# Patient Record
Sex: FEMALE | Race: WHITE | Employment: FULL TIME | URBAN - METROPOLITAN AREA
[De-identification: names, ages, dates, MRNs, and addresses within clinical notes are randomized per-mention and may not be internally consistent; named-entity substitution may affect disease eponyms.]

---

## 2022-02-25 ENCOUNTER — HOSPITAL ENCOUNTER (EMERGENCY)
Age: 32
Discharge: HOME | End: 2022-02-25
Attending: EMERGENCY MEDICINE
Payer: COMMERCIAL

## 2022-02-25 ENCOUNTER — APPOINTMENT (OUTPATIENT)
Dept: GENERAL RADIOLOGY | Age: 32
End: 2022-02-25
Payer: COMMERCIAL

## 2022-02-25 VITALS
HEART RATE: 70 BPM | DIASTOLIC BLOOD PRESSURE: 80 MMHG | SYSTOLIC BLOOD PRESSURE: 128 MMHG | OXYGEN SATURATION: 100 % | WEIGHT: 200.4 LBS | BODY MASS INDEX: 31.45 KG/M2 | HEIGHT: 67 IN | TEMPERATURE: 98.5 F | RESPIRATION RATE: 16 BRPM

## 2022-02-25 DIAGNOSIS — M25.872 SESAMOIDITIS OF LEFT FOOT: ICD-10-CM

## 2022-02-25 DIAGNOSIS — M79.672 LEFT FOOT PAIN: Primary | ICD-10-CM

## 2022-02-25 PROCEDURE — 6370000000 HC RX 637 (ALT 250 FOR IP): Performed by: EMERGENCY MEDICINE

## 2022-02-25 PROCEDURE — 99284 EMERGENCY DEPT VISIT MOD MDM: CPT

## 2022-02-25 PROCEDURE — 73630 X-RAY EXAM OF FOOT: CPT

## 2022-02-25 RX ORDER — IBUPROFEN 400 MG/1
400 TABLET ORAL ONCE
Status: COMPLETED | OUTPATIENT
Start: 2022-02-25 | End: 2022-02-25

## 2022-02-25 RX ORDER — ACETAMINOPHEN 325 MG/1
650 TABLET ORAL ONCE
Status: COMPLETED | OUTPATIENT
Start: 2022-02-25 | End: 2022-02-25

## 2022-02-25 RX ORDER — IBUPROFEN 400 MG/1
400 TABLET ORAL 4 TIMES DAILY PRN
Qty: 60 TABLET | Refills: 1 | Status: SHIPPED | OUTPATIENT
Start: 2022-02-25

## 2022-02-25 RX ORDER — ACETAMINOPHEN 325 MG/1
325 TABLET ORAL EVERY 6 HOURS PRN
Qty: 60 TABLET | Refills: 0 | Status: SHIPPED | OUTPATIENT
Start: 2022-02-25

## 2022-02-25 RX ADMIN — ACETAMINOPHEN 650 MG: 325 TABLET ORAL at 10:47

## 2022-02-25 RX ADMIN — IBUPROFEN 400 MG: 400 TABLET, FILM COATED ORAL at 10:47

## 2022-02-25 ASSESSMENT — PAIN DESCRIPTION - LOCATION
LOCATION: FOOT

## 2022-02-25 ASSESSMENT — PAIN SCALES - GENERAL
PAINLEVEL_OUTOF10: 5
PAINLEVEL_OUTOF10: 8
PAINLEVEL_OUTOF10: 6
PAINLEVEL_OUTOF10: 8

## 2022-02-25 ASSESSMENT — PAIN - FUNCTIONAL ASSESSMENT: PAIN_FUNCTIONAL_ASSESSMENT: 0-10

## 2022-02-25 ASSESSMENT — PAIN DESCRIPTION - ORIENTATION
ORIENTATION: LEFT
ORIENTATION: LEFT

## 2022-02-25 ASSESSMENT — PAIN DESCRIPTION - DESCRIPTORS: DESCRIPTORS: BURNING

## 2022-02-25 NOTE — Clinical Note
Marylin Barakat was seen and treated in our emergency department on 2/25/2022. She may return to work on 02/26/2022. If you have any questions or concerns, please don't hesitate to call.       Minda Hightower MD

## 2022-02-25 NOTE — ED PROVIDER NOTES
[address]      Pt Name: Jose De Jesus Sarah  MRN: [id number]  [place] 1990  Date of evaluation: 2/25/2022  Provider: Radha Pretty MD    CHIEF COMPLAINT     On Monday I woke up and I had pain in my foot. It hurts when I put weight on it and is gotten worse over the last 4 days. I took some ibuprofen which helped a little. HISTORY OF PRESENT ILLNESS  (Location/Symptom, Timing/Onset,Context/Setting, Quality, Duration, Modifying Factors, Severity). Note limiting factors. Chief Complaint   Patient presents with    Foot Pain     states she woke up 4 days ago with left great toe pain. No known injury      Jose De Jesus Sarah is a 36 y.o. female who presents to the emergency department secondary to concern for left foot pain located at the ball of her foot underneath her big toe. Denies any trauma or falls. Denies wearing new shoes. States she has not done any new activity or hobbies. 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 Denies prior injury to this foot, has not previously needed surgery on this foot. No Past medical history noted below, she reports no significant history. Currently smokes. Aside from what is stated above denies any other symptoms or modifying factors. Nursing Notes reviewed. REVIEW OF SYSTEMS  (2-9 systems for level 4, 10 or more for level 5)   Review of Systems  Pertinent positive and negative findings as documented in the HPI; otherwise all other systems were reviewed and were negative. PAST MEDICAL HISTORY   No past medical history on file. SURGICALHISTORY     No past surgical history on file. CURRENT MEDICATIONS       Previous Medications    No medications on file      ALLERGIES     Patient has no known allergies. FAMILY HISTORY     No family history on file.   SOCIAL HISTORY       Social History     Socioeconomic History    Marital status: Single     Spouse name: Not on file    Number of children: Not on file    Years of education: Not on file    Highest education level: Not on file   Occupational History    Not on file   Tobacco Use    Smoking status: Current Every Day Smoker     Packs/day: 0.50     Types: Cigarettes    Smokeless tobacco: Never Used   Substance and Sexual Activity    Alcohol use: No    Drug use: No    Sexual activity: Not Currently   Other Topics Concern    Not on file   Social History Narrative    Not on file     Social Determinants of Health     Financial Resource Strain:     Difficulty of Paying Living Expenses: Not on file   Food Insecurity:     Worried About Running Out of Food in the Last Year: Not on file    [place] of Food in the Last Year: Not on file   Transportation Needs:     Lack of Transportation (Medical): Not on file    Lack of Transportation (Non-Medical):  Not on file   Physical Activity:     Days of Exercise per Week: Not on file    Minutes of Exercise per Session: Not on file   Stress:     Feeling of Stress : Not on file   Social Connections:     Frequency of Communication with Friends and Family: Not on file    Frequency of Social Gatherings with Friends and Family: Not on file    Attends [employer]ism Services: Not on file    Active Member of [address] [place] [address] [employer] or Organizations: Not on file    Attends Club or Organization Meetings: Not on file    Marital Status: Not on file   Intimate Partner Violence:     Fear of Current or Ex-Partner: Not on file    Emotionally Abused: Not on file    Physically Abused: Not on file    Sexually Abused: Not on file   Housing Stability:     Unable to Pay for Housing in the Last Year: Not on file    Number of [place] in the Last Year: Not on file    Unstable Housing in the Last Year: Not on file     SCREENINGS    German Coma Scale  Eye Opening: Spontaneous  Best Verbal Response: Oriented  Best Motor Response: Obeys commands  German Coma Scale Score: 15    PHYSICAL EXAM  (up to 7 for level 4, 8 or more for level 5)   INITIAL VITALS: BP: 128/80, Temp: 98.5 °F (36.9 °C), Pulse: 70, Resp: 16, SpO2: 100 %   Physical Exam  Vitals reviewed. Constitutional:       Appearance: She is not toxic-appearing or diaphoretic. HENT:      Head: Normocephalic and atraumatic. Right Ear: External ear normal.      Left Ear: External ear normal.   Eyes:      General: No scleral icterus. Conjunctiva/sclera: Conjunctivae normal.   Neck:      Trachea: No tracheal deviation. Cardiovascular:      Rate and Rhythm: Normal rate. Pulses: Normal pulses. Comments: DP pulses palpable bilaterally  Pulmonary:      Effort: Pulmonary effort is normal. No respiratory distress. Musculoskeletal:      Cervical back: Normal range of motion. Right foot: Normal.      Left foot: Normal range of motion and normal capillary refill. Tenderness (point ttp to ball of foot on left, no overlying skin changes, no erythema/warmth) present. No swelling, laceration or crepitus. Normal pulse. Feet:    Skin:     General: Skin is dry. Capillary Refill: Capillary refill takes less than 2 seconds. Neurological:      General: No focal deficit present. Mental Status: She is alert and oriented to person, place, and time.    Psychiatric:         Behavior: Behavior normal.       DIAGNOSTIC RESULTS     RADIOLOGY:   Interpretation per Radiologist below, if available at the time of this note:  XR FOOT LEFT (MIN 3 VIEWS)   Final Result   No acute osseous abnormality           LABS:  Labs Reviewed - No data to display    EMERGENCY DEPARTMENT COURSE and DIFFERENTIAL DIAGNOSIS/MDM:   Patient was given the following medications:  Orders Placed This Encounter   Medications    ibuprofen (ADVIL;MOTRIN) tablet 400 mg    acetaminophen (TYLENOL) tablet 650 mg    ibuprofen (ADVIL;MOTRIN) 400 MG tablet     Sig: Take 1 tablet by mouth 4 times daily as needed for Pain or Fever     Dispense:  60 tablet     Refill:  1    acetaminophen (TYLENOL) 325 MG tablet     Sig: Take 1 tablet by mouth every 6 hours as needed for Pain     Dispense:  60 tablet     Refill:  0     CONSULTS:  None    INITIAL VITALS: BP: 128/80, Temp: 98.5 °F (36.9 °C), Pulse: 70, Resp: 16, SpO2: 100 %     Beulah Sequeira is a 36 y.o. female who presents to the emergency department secondary to concern for symptoms as noted in HPI. On arrival she is awake, alert, oriented with vitals that are HDS. Physical exam is notable for tenderness to palpation of left ball of foot in the area where the sesamoid is. No overlying skin changes, no erythema, no warmth. Ordered xray imaging, tylenol and motrin. Foot XR without any acute osseous abnormalities. On reassessment discussed results of x-ray imaging. Discussed likelihood of sesamoiditis given the location of her pain. Discussed use of NSAIDs and Tylenol for pain along with rest, ice, decreased activity. We talked about importance of follow-up and return precautions. She expressed understanding of all instructions, was in agreement with plan, and was discharged home in stable condition. FINAL IMPRESSION      1. Left foot pain    2.  Sesamoiditis of left foot        DISPOSITION/PLAN   DISPOSITION        PATIENT REFERRED TO:  [facility]    Schedule an appointment as soon as possible for a visit   For follow up appointment, As needed    [address]  [phone]  Schedule an appointment as soon as possible for a visit   For follow up appointment, As needed      DISCHARGE MEDICATIONS:  New Prescriptions    ACETAMINOPHEN (TYLENOL) 325 MG TABLET    Take 1 tablet by mouth every 6 hours as needed for Pain    IBUPROFEN (ADVIL;MOTRIN) 400 MG TABLET    Take 1 tablet by mouth 4 times daily as needed for Pain or Fever            (Please note that portions of this note were completed with a voice recognition program. Efforts were made to edit the dictations but occasionally words are mis-transcribed.)    Daniel Estrada MD (electronically signed)  Attending Emergency Physician        Daniel Estrada MD  02/25/22 5975

## 2022-02-25 NOTE — ED NOTES
AVS reviewed with patient. Verbalized understanding. AVS was printed and given to patient. Prescriptions sent electronically to patients pharmacy of choice.      Siva Cohen RN  02/25/22 1510

## 2022-07-27 ENCOUNTER — APPOINTMENT (OUTPATIENT)
Dept: ULTRASOUND IMAGING | Age: 32
End: 2022-07-27
Payer: COMMERCIAL

## 2022-07-27 ENCOUNTER — HOSPITAL ENCOUNTER (EMERGENCY)
Age: 32
Discharge: HOME | End: 2022-07-27
Attending: EMERGENCY MEDICINE
Payer: COMMERCIAL

## 2022-07-27 VITALS
HEART RATE: 92 BPM | RESPIRATION RATE: 16 BRPM | HEIGHT: 67 IN | SYSTOLIC BLOOD PRESSURE: 152 MMHG | DIASTOLIC BLOOD PRESSURE: 96 MMHG | BODY MASS INDEX: 31.97 KG/M2 | TEMPERATURE: 98.5 F | WEIGHT: 203.71 LBS | OXYGEN SATURATION: 100 %

## 2022-07-27 DIAGNOSIS — O26.891 ABDOMINAL PAIN DURING PREGNANCY IN FIRST TRIMESTER: Primary | ICD-10-CM

## 2022-07-27 DIAGNOSIS — R10.9 ABDOMINAL PAIN DURING PREGNANCY IN FIRST TRIMESTER: Primary | ICD-10-CM

## 2022-07-27 DIAGNOSIS — O36.80X0 PREGNANCY OF UNKNOWN ANATOMIC LOCATION: ICD-10-CM

## 2022-07-27 LAB
A/G RATIO: 1.9 (ref 1.1–2.2)
ALBUMIN SERPL-MCNC: 4.6 G/DL (ref 3.4–5)
ALP BLD-CCNC: 73 U/L (ref 40–129)
ALT SERPL-CCNC: 9 U/L (ref 10–40)
ANION GAP SERPL CALCULATED.3IONS-SCNC: 12 MMOL/L (ref 3–16)
AST SERPL-CCNC: 12 U/L (ref 15–37)
BACTERIA WET PREP: NORMAL
BACTERIA: ABNORMAL /HPF
BASOPHILS ABSOLUTE: 0 K/UL (ref 0–0.2)
BASOPHILS RELATIVE PERCENT: 0.2 %
BILIRUB SERPL-MCNC: 0.3 MG/DL (ref 0–1)
BILIRUBIN URINE: NEGATIVE
BLOOD, URINE: ABNORMAL
BUN BLDV-MCNC: 11 MG/DL (ref 7–20)
CALCIUM SERPL-MCNC: 9.1 MG/DL (ref 8.3–10.6)
CHLORIDE BLD-SCNC: 103 MMOL/L (ref 99–110)
CLARITY: CLEAR
CLUE CELLS: NORMAL
CO2: 24 MMOL/L (ref 21–32)
COLOR: YELLOW
CREAT SERPL-MCNC: 0.7 MG/DL (ref 0.6–1.1)
EOSINOPHILS ABSOLUTE: 0.1 K/UL (ref 0–0.6)
EOSINOPHILS RELATIVE PERCENT: 1.2 %
EPITHELIAL CELLS WET PREP: NORMAL
EPITHELIAL CELLS, UA: ABNORMAL /HPF (ref 0–5)
GFR AFRICAN AMERICAN: >60
GFR NON-AFRICAN AMERICAN: >60
GLUCOSE BLD-MCNC: 93 MG/DL (ref 70–99)
GLUCOSE URINE: NEGATIVE MG/DL
GONADOTROPIN, CHORIONIC (HCG) QUANT: 900.7 MIU/ML
HCG(URINE) PREGNANCY TEST: POSITIVE
HCT VFR BLD CALC: 34.5 % (ref 36–48)
HEMOGLOBIN: 11.7 G/DL (ref 12–16)
KETONES, URINE: NEGATIVE MG/DL
LEUKOCYTE ESTERASE, URINE: ABNORMAL
LYMPHOCYTES ABSOLUTE: 1.5 K/UL (ref 1–5.1)
LYMPHOCYTES RELATIVE PERCENT: 23.3 %
MCH RBC QN AUTO: 29.4 PG (ref 26–34)
MCHC RBC AUTO-ENTMCNC: 33.8 G/DL (ref 31–36)
MCV RBC AUTO: 86.9 FL (ref 80–100)
MICROSCOPIC EXAMINATION: YES
MONOCYTES ABSOLUTE: 0.5 K/UL (ref 0–1.3)
MONOCYTES RELATIVE PERCENT: 7.5 %
NEUTROPHILS ABSOLUTE: 4.4 K/UL (ref 1.7–7.7)
NEUTROPHILS RELATIVE PERCENT: 67.8 %
NITRITE, URINE: NEGATIVE
PDW BLD-RTO: 16 % (ref 12.4–15.4)
PH UA: 6.5 (ref 5–8)
PLATELET # BLD: 277 K/UL (ref 135–450)
PMV BLD AUTO: 8.2 FL (ref 5–10.5)
POTASSIUM SERPL-SCNC: 3.6 MMOL/L (ref 3.5–5.1)
PROTEIN UA: NEGATIVE MG/DL
RBC # BLD: 3.97 M/UL (ref 4–5.2)
RBC UA: ABNORMAL /HPF (ref 0–4)
RBC WET PREP: NORMAL
SODIUM BLD-SCNC: 139 MMOL/L (ref 136–145)
SOURCE WET PREP: NORMAL
SPECIFIC GRAVITY UA: 1.02 (ref 1–1.03)
TOTAL PROTEIN: 7 G/DL (ref 6.4–8.2)
TRICHOMONAS PREP: NORMAL
URINE REFLEX TO CULTURE: ABNORMAL
URINE TYPE: ABNORMAL
UROBILINOGEN, URINE: 1 E.U./DL
WBC # BLD: 6.5 K/UL (ref 4–11)
WBC UA: ABNORMAL /HPF (ref 0–5)
WBC WET PREP: NORMAL
YEAST WET PREP: NORMAL

## 2022-07-27 PROCEDURE — 76801 OB US < 14 WKS SINGLE FETUS: CPT

## 2022-07-27 PROCEDURE — 84703 CHORIONIC GONADOTROPIN ASSAY: CPT

## 2022-07-27 PROCEDURE — 80053 COMPREHEN METABOLIC PANEL: CPT

## 2022-07-27 PROCEDURE — 87491 CHLMYD TRACH DNA AMP PROBE: CPT

## 2022-07-27 PROCEDURE — 85025 COMPLETE CBC W/AUTO DIFF WBC: CPT

## 2022-07-27 PROCEDURE — 36415 COLL VENOUS BLD VENIPUNCTURE: CPT

## 2022-07-27 PROCEDURE — 87591 N.GONORRHOEAE DNA AMP PROB: CPT

## 2022-07-27 PROCEDURE — 99283 EMERGENCY DEPT VISIT LOW MDM: CPT

## 2022-07-27 PROCEDURE — 81001 URINALYSIS AUTO W/SCOPE: CPT

## 2022-07-27 PROCEDURE — 87210 SMEAR WET MOUNT SALINE/INK: CPT

## 2022-07-27 PROCEDURE — 84702 CHORIONIC GONADOTROPIN TEST: CPT

## 2022-07-27 ASSESSMENT — ENCOUNTER SYMPTOMS
COLOR CHANGE: 0
NAUSEA: 0
FACIAL SWELLING: 0
VOMITING: 0
SHORTNESS OF BREATH: 0
STRIDOR: 0
WHEEZING: 0
TROUBLE SWALLOWING: 0
ABDOMINAL PAIN: 1
BACK PAIN: 1
VOICE CHANGE: 0
SORE THROAT: 0
RESPIRATORY NEGATIVE: 1

## 2022-07-27 ASSESSMENT — PAIN - FUNCTIONAL ASSESSMENT: PAIN_FUNCTIONAL_ASSESSMENT: NONE - DENIES PAIN

## 2022-07-27 NOTE — ED PROVIDER NOTES
[address] [facility]  eMERGENCY dEPARTMENT eNCOUnter      Pt Name: Franklin Butler  MRN: [id number]  [place] 1990  Date of evaluation: 2022  Provider: João Blas MD    CHIEF COMPLAINT       Chief Complaint   Patient presents with    Pregnancy Test     Pt states mixed results on pregnancy tests, would like confirmation. Lower back and abd pain x5days. HISTORY OF PRESENT ILLNESS   (Location/Symptom, Timing/Onset, Context/Setting, Quality, Duration, Modifying Factors, Severity)  Note limiting factors. Franklin Butler is a 29 y.o. female  4 weeks 6 days pregnant by LMP who presents for 5 days of cramping lower abdominal and back pain. Patient reports she has had mixed results with her pregnancy test and would also like confirmation that her pregnancy test is positive. Patient [place] any urinary symptoms. Denies any fever. Denies any chest pain shortness of breath or hemoptysis. Reports her symptoms are moderate intermittent and wax and wanes. Denies any known aggravating or alleviating factors. HPI    Nursing Notes were reviewed. REVIEW OFSYSTEMS    (2-9 systems for level 4, 10 or more for level 5)     Review of Systems   Constitutional:  Negative for appetite change, fever and unexpected weight change. HENT:  Negative for facial swelling, trouble swallowing and voice change. Eyes:  Negative for visual disturbance. Respiratory:  Negative for shortness of breath, wheezing and stridor. Cardiovascular:  Negative for chest pain and palpitations. Gastrointestinal:  Positive for abdominal pain. Negative for nausea and vomiting. Genitourinary:  Negative for dysuria and vaginal bleeding. Musculoskeletal:  Positive for back pain. Negative for neck pain and neck stiffness. Skin:  Negative for color change and wound. Neurological:  Negative for seizures and syncope. Psychiatric/Behavioral:  Negative for self-injury and suicidal ideas.       Except as noted above the remainder of the review of systems was reviewed and negative. PAST MEDICAL HISTORY   History reviewed. No pertinent past medical history. SURGICAL HISTORY     History reviewed. No pertinent surgical history. CURRENT MEDICATIONS       Previous Medications    ACETAMINOPHEN (TYLENOL) 325 MG TABLET    Take 1 tablet by mouth every 6 hours as needed for Pain    IBUPROFEN (ADVIL;MOTRIN) 400 MG TABLET    Take 1 tablet by mouth 4 times daily as needed for Pain or Fever       ALLERGIES     Patient has no known allergies. FAMILY HISTORY     History reviewed. No pertinent family history. SOCIAL HISTORY       Social History     Socioeconomic History    Marital status: Single     Spouse name: None    Number of children: None    Years of education: None    Highest education level: None   Tobacco Use    Smoking status: Every Day     Packs/day: 0.50     Types: Cigarettes    Smokeless tobacco: Never   Substance and Sexual Activity    Alcohol use: No    Drug use: No    Sexual activity: Not Currently         PHYSICAL EXAM    (up to 7 for level 4, 8 or more for level 5)     ED Triage Vitals [07/27/22 1717]   BP Temp Temp Source Heart Rate Resp SpO2 Height Weight   (!) 152/96 98.5 °F (36.9 °C) Oral 92 16 100 % 5' 7\" (1.702 m) 203 lb 11.3 oz (92.4 kg)       Physical Exam  Vitals and nursing note reviewed. Constitutional:       Appearance: She is well-developed. She is not diaphoretic. HENT:      Head: Normocephalic and atraumatic. Right Ear: External ear normal.      Left Ear: External ear normal.   Eyes:      Conjunctiva/sclera: Conjunctivae normal.   Neck:      Vascular: No JVD. Trachea: No tracheal deviation. Cardiovascular:      Rate and Rhythm: Normal rate. Pulmonary:      Effort: Pulmonary effort is normal. No respiratory distress. Breath sounds: Normal breath sounds. No wheezing. Abdominal:      General: There is no distension. Palpations: Abdomen is soft. Abdominal pain during pregnancy in first trimester    2. Pregnancy of unknown anatomic location          DISPOSITION/PLAN   DISPOSITION  Discharge      (Please note that portions of this note were completed with a voice recognition program.  Efforts were made to edit the dictations but occasionally words aremis-transcribed. )    Justin Mcgovern MD (electronically signed)  Attending Emergency Physician           Justin Mcgovern MD  07/27/22 2087

## 2022-07-27 NOTE — ED NOTES
Report given to Eduardo Davies RN at this time, all questions answered. Denies any further questions at this time. No further patient contact.        Orquidea Mathur RN  07/27/22 7086

## 2022-07-27 NOTE — ED TRIAGE NOTES
Patient presents to ED complaining of low back and abdominal pain x5 days. Patient requesting pregnancy test, states she is actively trying to get pregnant, and has taken several positive pregnancy tests at home. Patient states when she went to her doctor, she was told her pregnancy test there was negative. Patient resting on bed, respirations even and easy at this time. No obvious distress.

## 2022-07-28 LAB
C TRACH DNA GENITAL QL NAA+PROBE: NEGATIVE
N. GONORRHOEAE DNA: NEGATIVE

## 2022-07-28 NOTE — ED NOTES
Patient discharged in stable condition. Patient alert and oriented x4 and ambulatory at time of discharge. All discharge instructions reviewed by this RN.      Jalen Ornelas RN  07/27/22 6627

## 2022-07-28 NOTE — ED PROVIDER NOTES
[address] [facility]      Pt Name: Katerine Escobar  MRN: [id number]  [place] 1990  Date ofevaluation: 7/27/2022  Provider: Cyndie Loyola MD    CHIEF COMPLAINT       Chief Complaint   Patient presents with    Pregnancy Test     Pt states mixed results on pregnancy tests, would like confirmation. Lower back and abd pain x5days. Abdominal Pain         HISTORY OF PRESENT ILLNESS   (Location/Symptom, Timing/Onset,Context/Setting, Quality, Duration, Modifying Factors, Severity)  Note limiting factors. Katerine Escobar is a 29 y.o. female  who  has no past medical history on file. who presents to the emergency department    82-year-old female who presents from [address] [place] for pelvic ultrasound. Patient has LMP approximately 5 weeks ago. 5 days of lower abdominal pain radiating to her lower back. Nothing seems make it better or worse. Mixed results at home with her pregnancy test.  Patient came to [address] [place] [address] and was found to have a positive pregnancy test.  Denies any other associated symptoms. No other modifying factors. Moderate in nature. Gradual in onset fluctuating in intensity. Multiple previous pregnancies. The history is provided by the patient. No  was used. NursingNotes were reviewed. REVIEW OF SYSTEMS    (2-9 systems for level 4, 10 or more for level 5)     Review of Systems   Constitutional: Negative. HENT:  Negative for congestion and sore throat. Respiratory: Negative. Negative for shortness of breath. Cardiovascular: Negative. Negative for chest pain. Gastrointestinal:  Positive for abdominal pain. Genitourinary: Negative. Negative for vaginal bleeding and vaginal discharge. Neurological: Negative. Negative for headaches. Except as noted above the remainder of the review of systems was reviewed and negative. PAST MEDICAL HISTORY   History reviewed.  No pertinent past medical history. SURGICALHISTORY     History reviewed. No pertinent surgical history. CURRENT MEDICATIONS       Previous Medications    ACETAMINOPHEN (TYLENOL) 325 MG TABLET    Take 1 tablet by mouth every 6 hours as needed for Pain    IBUPROFEN (ADVIL;MOTRIN) 400 MG TABLET    Take 1 tablet by mouth 4 times daily as needed for Pain or Fever            Patient has no known allergies. FAMILY HISTORY     History reviewed. No pertinent family history. SOCIAL HISTORY       Social History     Socioeconomic History    Marital status: Single     Spouse name: None    Number of children: None    Years of education: None    Highest education level: None   Tobacco Use    Smoking status: Every Day     Packs/day: 0.50     Types: Cigarettes    Smokeless tobacco: Never   Substance and Sexual Activity    Alcohol use: No    Drug use: No    Sexual activity: Not Currently       SCREENINGS    German Coma Scale  Eye Opening: Spontaneous  Best Verbal Response: Oriented  Best Motor Response: Obeys commands  German Coma Scale Score: 15        PHYSICAL EXAM    (up to 7 for level 4, 8 or more for level 5)     ED Triage Vitals [07/27/22 1717]   BP Temp Temp Source Heart Rate Resp SpO2 Height Weight   (!) 152/96 98.5 °F (36.9 °C) Oral 92 16 100 % 5' 7\" (1.702 m) 203 lb 11.3 oz (92.4 kg)       Physical Exam  Vitals and nursing note reviewed. Constitutional:       General: She is not in acute distress. Appearance: Normal appearance. She is not ill-appearing, toxic-appearing or diaphoretic. HENT:      Head: Normocephalic and atraumatic. Mouth/Throat:      Mouth: Mucous membranes are moist.   Eyes:      Extraocular Movements: Extraocular movements intact. Cardiovascular:      Rate and Rhythm: Normal rate. Pulses: Normal pulses. Pulmonary:      Effort: Pulmonary effort is normal.   Abdominal:      Palpations: Abdomen is soft. Tenderness: There is no abdominal tenderness.    Genitourinary:     Comments: deffered  Skin:     General: Skin is warm and dry. Capillary Refill: Capillary refill takes less than 2 seconds. Neurological:      General: No focal deficit present. Mental Status: She is alert. Psychiatric:         Mood and Affect: Mood normal.       RESULTS       RADIOLOGY:   Non-plain filmimages such as CT, Ultrasound and MRI are read by the radiologist.   Interpretation per the Radiologist below, if available at the time ofthis note:    US OB LESS THAN [address]   Final Result   Normal size uterus with moderate heterogeneous thickening in the endometrium   which could represent secretory thickening or possible clot with no   gestational sac, fetal pole, or cardiac activity documented. The patient has   a positive pregnancy test and this could represent a very early pregnancy vs   spontaneous  or less likely an occult ectopic. Recommend correlating   with serial beta HCG's and follow-up ultrasound exams. Normal left ovary with no ovarian or adnexal mass and normal blood flow   documented to the ovary. Nonvisualization of the right ovary may be obscured due to overlying bowel   gas. No adnexal mass or free fluid.                ED BEDSIDE ULTRASOUND:   Performed by ED Physician - none    LABS:  Labs Reviewed   URINALYSIS WITH REFLEX TO CULTURE - Abnormal; Notable for the following components:       Result Value    Blood, Urine TRACE-INTACT (*)     Leukocyte Esterase, Urine TRACE (*)     All other components within normal limits   MICROSCOPIC URINALYSIS - Abnormal; Notable for the following components:    Bacteria, UA Rare (*)     All other components within normal limits   CBC WITH AUTO DIFFERENTIAL - Abnormal; Notable for the following components:    RBC 3.97 (*)     Hemoglobin 11.7 (*)     Hematocrit 34.5 (*)     RDW 16.0 (*)     All other components within normal limits   COMPREHENSIVE METABOLIC PANEL - Abnormal; Notable for the following components:    ALT 9 (*)     AST 12 (*)     All other components within normal limits   WET PREP, GENITAL   C.TRACHOMATIS N.GONORRHOEAE DNA   PREGNANCY, URINE   HCG, QUANTITATIVE, PREGNANCY       All other labs were within normal range or not returned as of this dictation. EMERGENCY DEPARTMENT COURSE and DIFFERENTIAL DIAGNOSIS/MDM:   Vitals:    Vitals:    07/27/22 1717   BP: (!) 152/96   Pulse: 92   Resp: 16   Temp: 98.5 °F (36.9 °C)   TempSrc: Oral   SpO2: 100%   Weight: 203 lb 11.3 oz (92.4 kg)   Height: 5' 7\" (1.702 m)       Patient was given thefollowing medications:  Medications - No data to display    ED COURSE & MEDICAL DECISION MAKING    Pertinent Labs & Imaging studies reviewed. (See chart for details)   -  Patient seen and evaluated in the emergency department. -  Triage and nursing notes reviewed and incorporated. -  Old chart records reviewed and incorporated. -  Differential diagnosis includes: Differential diagnosis: Ectopic pregnancy, Molar pregnancy, Miscarriage, Hemorrhagic Shock, Rh incompatibility, UTI, other. 51-year-old female presents for lower abdominal pain and pregnancy. Labs relatively unremarkable at [address] [place] [address]. No vaginal bleeding. Just pain. Ultrasound here shows no definitive pregnancy. No signs of ectopic. No signs of retained products. May be early pregnancy, threatened AB, occult ectopic. Patient will need follow-up with OB/GYN. As well as repeat blood testing. We will give strict return precautions for any new or worsening symptoms as well as instructions follow-up with OB/GYN.      -  Work-up included:  See above  -  ED treatment included: See above  -  Results discussed with patient. The patient is agreeable with plan of care and disposition. Is this patient to be included in the SEP-1 Core Measure due to severe sepsis or septic shock? No   Exclusion criteria - the patient is NOT to be included for SEP-1 Core Measure due to:   Infection is not suspected REASSESSMENT      The patient is at low risk for mortality based on demographic, history and clinical factors. Given the best available information and clinical assessment, I estimate the risk of hospitalization to be greater than risk of treatment at home. I have explained to the patient that the risk could rapidly change, given precautions for return and instructions. Explained to patient that the risk for mortality is low based on demographic, history and clinical factors. I discussed with patient the results of evaluation in the ED, diagnosis, care, and prognosis. The plan is to discharge to home. Patient is in agreement with plan and questions have been answered. I also discussed with patient the reasons which may require a return visit and the importance of follow-up care. The patient is well-appearing, nontoxic, and improved at the time of discharge. Patient agrees to call to arrange follow-up care as directed. Patient understands to return immediately for worsening/change in symptoms. CRITICAL CARE TIME   Total Critical Care time was 0 minutes, excluding separately reportable procedures. There was a high probability of clinically significant/life threatening deterioration in the patient's condition which required my urgent intervention. This includes multiple reevaluations, vital sign monitoring, pulse oximetry monitoring, telemetry monitoring, clinical response to the IV medications, reviewing the nursing notes, consultation time, dictation/documentation time, and interpretation of the labwork. (This time excludes time spent performing procedures). CONSULTS:  None    PROCEDURES:  Unless otherwise noted below, none     Procedures    FINAL IMPRESSION      1. Abdominal pain during pregnancy in first trimester    2.  Pregnancy of unknown anatomic location          DISPOSITION/PLAN   DISPOSITION Decision To Transfer 07/27/2022 08:01:41 PM      PATIENT REFERREDTO:  No follow-up provider specified.     DISCHARGEMEDICATIONS:  New Prescriptions    No medications on file          (Please note that portions of this note were completed with a voice recognition program.  Efforts were made to edit the dictations but occasionally words are mis-transcribed.)    Chelsi Jenkins MD (electronically signed)  Attending Emergency Physician          Chelsi Jenkins MD  07/27/22 6849

## 2022-08-05 ENCOUNTER — APPOINTMENT (OUTPATIENT)
Dept: GENERAL RADIOLOGY | Age: 32
End: 2022-08-05
Payer: COMMERCIAL

## 2022-08-05 ENCOUNTER — HOSPITAL ENCOUNTER (EMERGENCY)
Age: 32
Discharge: HOME | End: 2022-08-05
Attending: EMERGENCY MEDICINE
Payer: COMMERCIAL

## 2022-08-05 VITALS
HEART RATE: 82 BPM | WEIGHT: 196 LBS | HEIGHT: 67 IN | RESPIRATION RATE: 16 BRPM | TEMPERATURE: 98.5 F | OXYGEN SATURATION: 98 % | SYSTOLIC BLOOD PRESSURE: 120 MMHG | BODY MASS INDEX: 30.76 KG/M2 | DIASTOLIC BLOOD PRESSURE: 74 MMHG

## 2022-08-05 DIAGNOSIS — R42 EPISODIC LIGHTHEADEDNESS: Primary | ICD-10-CM

## 2022-08-05 LAB
A/G RATIO: 1.9 (ref 1.1–2.2)
ALBUMIN SERPL-MCNC: 4.1 G/DL (ref 3.4–5)
ALP BLD-CCNC: 69 U/L (ref 40–129)
ALT SERPL-CCNC: 8 U/L (ref 10–40)
ANION GAP SERPL CALCULATED.3IONS-SCNC: 12 MMOL/L (ref 3–16)
AST SERPL-CCNC: 11 U/L (ref 15–37)
BASOPHILS ABSOLUTE: 0 K/UL (ref 0–0.2)
BASOPHILS RELATIVE PERCENT: 0.5 %
BILIRUB SERPL-MCNC: <0.2 MG/DL (ref 0–1)
BILIRUBIN URINE: NEGATIVE
BLOOD, URINE: ABNORMAL
BUN BLDV-MCNC: 11 MG/DL (ref 7–20)
CALCIUM SERPL-MCNC: 8.8 MG/DL (ref 8.3–10.6)
CHLORIDE BLD-SCNC: 102 MMOL/L (ref 99–110)
CLARITY: CLEAR
CO2: 24 MMOL/L (ref 21–32)
COLOR: YELLOW
CREAT SERPL-MCNC: 0.8 MG/DL (ref 0.6–1.1)
EOSINOPHILS ABSOLUTE: 0.1 K/UL (ref 0–0.6)
EOSINOPHILS RELATIVE PERCENT: 1.2 %
EPITHELIAL CELLS, UA: NORMAL /HPF (ref 0–5)
GFR AFRICAN AMERICAN: >60
GFR NON-AFRICAN AMERICAN: >60
GLUCOSE BLD-MCNC: 101 MG/DL (ref 70–99)
GLUCOSE BLD-MCNC: 80 MG/DL (ref 70–99)
GLUCOSE URINE: NEGATIVE MG/DL
HCT VFR BLD CALC: 33.6 % (ref 36–48)
HEMOGLOBIN: 11.5 G/DL (ref 12–16)
KETONES, URINE: NEGATIVE MG/DL
LEUKOCYTE ESTERASE, URINE: NEGATIVE
LYMPHOCYTES ABSOLUTE: 1.5 K/UL (ref 1–5.1)
LYMPHOCYTES RELATIVE PERCENT: 23.3 %
MCH RBC QN AUTO: 29.5 PG (ref 26–34)
MCHC RBC AUTO-ENTMCNC: 34.3 G/DL (ref 31–36)
MCV RBC AUTO: 86.1 FL (ref 80–100)
MICROSCOPIC EXAMINATION: YES
MONOCYTES ABSOLUTE: 0.4 K/UL (ref 0–1.3)
MONOCYTES RELATIVE PERCENT: 6.7 %
NEUTROPHILS ABSOLUTE: 4.3 K/UL (ref 1.7–7.7)
NEUTROPHILS RELATIVE PERCENT: 68.3 %
NITRITE, URINE: NEGATIVE
PDW BLD-RTO: 15.8 % (ref 12.4–15.4)
PERFORMED ON: ABNORMAL
PH UA: 6 (ref 5–8)
PLATELET # BLD: 267 K/UL (ref 135–450)
PMV BLD AUTO: 8 FL (ref 5–10.5)
POTASSIUM SERPL-SCNC: 3.9 MMOL/L (ref 3.5–5.1)
PROTEIN UA: NEGATIVE MG/DL
RBC # BLD: 3.9 M/UL (ref 4–5.2)
RBC UA: NORMAL /HPF (ref 0–4)
SODIUM BLD-SCNC: 138 MMOL/L (ref 136–145)
SPECIFIC GRAVITY UA: >=1.03 (ref 1–1.03)
TOTAL PROTEIN: 6.3 G/DL (ref 6.4–8.2)
URINE REFLEX TO CULTURE: ABNORMAL
URINE TYPE: ABNORMAL
UROBILINOGEN, URINE: 1 E.U./DL
WBC # BLD: 6.3 K/UL (ref 4–11)
WBC UA: NORMAL /HPF (ref 0–5)

## 2022-08-05 PROCEDURE — 99284 EMERGENCY DEPT VISIT MOD MDM: CPT

## 2022-08-05 PROCEDURE — 85025 COMPLETE CBC W/AUTO DIFF WBC: CPT

## 2022-08-05 PROCEDURE — 71045 X-RAY EXAM CHEST 1 VIEW: CPT

## 2022-08-05 PROCEDURE — 80053 COMPREHEN METABOLIC PANEL: CPT

## 2022-08-05 PROCEDURE — 2580000003 HC RX 258: Performed by: EMERGENCY MEDICINE

## 2022-08-05 PROCEDURE — 81001 URINALYSIS AUTO W/SCOPE: CPT

## 2022-08-05 RX ORDER — 0.9 % SODIUM CHLORIDE 0.9 %
1000 INTRAVENOUS SOLUTION INTRAVENOUS ONCE
Status: COMPLETED | OUTPATIENT
Start: 2022-08-05 | End: 2022-08-05

## 2022-08-05 RX ADMIN — SODIUM CHLORIDE 1000 ML: 9 INJECTION, SOLUTION INTRAVENOUS at 17:50

## 2022-08-05 ASSESSMENT — ENCOUNTER SYMPTOMS
WHEEZING: 0
ABDOMINAL PAIN: 0
COLOR CHANGE: 0
TROUBLE SWALLOWING: 0
VOMITING: 0
NAUSEA: 0
STRIDOR: 0
SHORTNESS OF BREATH: 1
FACIAL SWELLING: 0
VOICE CHANGE: 0

## 2022-08-05 ASSESSMENT — PAIN - FUNCTIONAL ASSESSMENT: PAIN_FUNCTIONAL_ASSESSMENT: NONE - DENIES PAIN

## 2022-08-05 NOTE — ED NOTES
AVS reviewed with patient. Verbalized understanding. AVS was printed and given to patient.        Betina Boyle RN  08/05/22 2522

## 2022-08-05 NOTE — ED PROVIDER NOTES
[id number] [facility]  eMERGENCY dEPARTMENT eNCOUnter      Pt Name: Shannan Albarran  MRN: [id number]  Arm[place] 1990  Date of evaluation: 8/5/2022  Provider: Disha Julien MD    [address] [place] [address]       Chief Complaint   Patient presents with    Other     Patient states her ob/gyn sent here in ED because she had a blood test 2 days ago with a blood sugar of 45. Patient states she just found out she is pregnant. Also c/o shortness of breath and feeling tired. HISTORY OF PRESENT ILLNESS   (Location/Symptom, Timing/Onset, Context/Setting, Quality, Duration, Modifying Factors, Severity)  Note limiting factors. Shannan Albarran is a 29 y.o. female G3, P2 who is currently 6 weeks pregnant. The patient did have an emergency department ultrasound that did not show a definitive pregnancy however has since followed up with OB/GYN. She denies any current abdominal pain, vaginal bleeding, or any symptoms similar to that she can Emergency Department for the past.  She reports that she feels comfortable with her pregnancy and is following up with OB/GYN for that. She reports the reason she is in the emergency department today is because she had a blood sugar of 45 during her 1 hour glucose screening and pregnancy. She reports frequent episodes of lightheadedness, diaphoresis, feeling tired, and shortness of breath. These do resolve with eating food. She denies any other symptoms currently but it was recommended that she come to the emergency department for a chest x-ray given the shortness of breath. Patient denies any chest pain hemoptysis leg swelling or history of blood clots. She reports she is currently asymptomatic. HPI    Nursing Notes were reviewed. REVIEW OFSYSTEMS    (2-9 systems for level 4, 10 or more for level 5)     Review of Systems   Constitutional:  Positive for fatigue. Negative for appetite change, fever and unexpected weight change.    HENT:  Negative for facial swelling, trouble swallowing and voice change. Eyes:  Negative for visual disturbance. Respiratory:  Positive for shortness of breath. Negative for wheezing and stridor. Cardiovascular:  Negative for chest pain and palpitations. Gastrointestinal:  Negative for abdominal pain, nausea and vomiting. Genitourinary:  Negative for dysuria and vaginal bleeding. Musculoskeletal:  Negative for neck pain and neck stiffness. Skin:  Negative for color change and wound. Neurological:  Positive for light-headedness. Negative for seizures and syncope. Psychiatric/Behavioral:  Negative for self-injury and suicidal ideas. Except as noted above the remainder of the review of systems was reviewed and negative. PAST MEDICAL HISTORY   History reviewed. No pertinent past medical history. SURGICAL HISTORY     History reviewed. No pertinent surgical history. CURRENT MEDICATIONS       Previous Medications    ACETAMINOPHEN (TYLENOL) 325 MG TABLET    Take 1 tablet by mouth every 6 hours as needed for Pain    IBUPROFEN (ADVIL;MOTRIN) 400 MG TABLET    Take 1 tablet by mouth 4 times daily as needed for Pain or Fever       ALLERGIES     Patient has no known allergies. FAMILY HISTORY     History reviewed. No pertinent family history.        SOCIAL HISTORY       Social History     Socioeconomic History    Marital status: Single     Spouse name: None    Number of children: None    Years of education: None    Highest education level: None   Tobacco Use    Smoking status: Every Day     Packs/day: 0.50     Types: Cigarettes    Smokeless tobacco: Never   Substance and Sexual Activity    Alcohol use: No    Drug use: No    Sexual activity: Not Currently         PHYSICAL EXAM    (up to 7 for level 4, 8 or more for level 5)     ED Triage Vitals [08/05/22 1656]   BP Temp Temp Source Heart Rate Resp SpO2 Height Weight   120/74 98.5 °F (36.9 °C) Oral 82 16 -- 5' 7\" (1.702 m) 196 lb (88.9 kg)       Physical Exam  Vitals and nursing note reviewed. Constitutional:       Appearance: She is well-developed. She is not diaphoretic. HENT:      Head: Normocephalic and atraumatic. Right Ear: External ear normal.      Left Ear: External ear normal.   Eyes:      Conjunctiva/sclera: Conjunctivae normal.   Neck:      Vascular: No JVD. Trachea: No tracheal deviation. Cardiovascular:      Rate and Rhythm: Normal rate. Pulses: Normal pulses. Pulmonary:      Effort: Pulmonary effort is normal. No respiratory distress. Breath sounds: Normal breath sounds. No wheezing. Abdominal:      General: There is no distension. Palpations: Abdomen is soft. Tenderness: There is no abdominal tenderness. There is no guarding or rebound. Musculoskeletal:         General: No tenderness or deformity. Normal range of motion. Cervical back: Neck supple. Comments: No lower extremity swelling, tenderness, or palpable cord. Negative Tyrone test bilaterally. Skin:     General: Skin is warm and dry. Neurological:      General: No focal deficit present. Mental Status: She is alert and oriented to person, place, and time. Cranial Nerves: No cranial nerve deficit. Comments: Normal speech. No mental status. Normal gait on own power. No focal neurologic deficits. DIAGNOSTIC RESULTS       RADIOLOGY:     Interpretation per the Radiologist below, if available at the time of this note:    XR CHEST PORTABLE   Final Result   No acute cardiopulmonary disease.                ED BEDSIDE ULTRASOUND:   Performed by ED Physician - none    LABS:  Labs Reviewed   URINALYSIS WITH REFLEX TO CULTURE - Abnormal; Notable for the following components:       Result Value    Blood, Urine TRACE-INTACT (*)     All other components within normal limits   CBC WITH AUTO DIFFERENTIAL - Abnormal; Notable for the following components:    RBC 3.90 (*)     Hemoglobin 11.5 (*)     Hematocrit 33.6 (*)     RDW 15.8 (*) All other components within normal limits   COMPREHENSIVE METABOLIC PANEL - Abnormal; Notable for the following components: Total Protein 6.3 (*)     ALT 8 (*)     AST 11 (*)     All other components within normal limits   POCT GLUCOSE - Abnormal; Notable for the following components:    POC Glucose 101 (*)     All other components within normal limits   MICROSCOPIC URINALYSIS       All otherlabs were within normal range or not returned as of this dictation. EMERGENCY DEPARTMENT COURSE and DIFFERENTIAL DIAGNOSIS/MDM:   Vitals:    Vitals:    08/05/22 1656   BP: 120/74   Pulse: 82   Resp: 16   Temp: 98.5 °F (36.9 °C)   TempSrc: Oral   Weight: 196 lb (88.9 kg)   Height: 5' 7\" (1.702 m)       MDM  Work-up in the emergency department unremarkable. Initial point-of-care glucose of 101 and laboratory evaluation shows glucose of 80. No signs of acute endorgan damage. Chest x-ray is unremarkable. I have offered the patient COVID testing she is politely declined. No chest pain, hemoptysis, leg swelling, or tachycardia do not suspect pulmonary embolism at this time but given patient standard ER return precautions for this. I feel the patient is having intermittent hypoglycemia based on her history, symptoms, work-up, and physical exam.  I have encouraged her to eat frequent small meals and keep juice and/or a sugary snack on hand to eat whenever her symptoms occur. If these relieve her symptoms I have advised that she continue to stay hydrated, continue eating frequent small meals, and follow-up with her OB/GYN or primary care doctor. If these fail to relieve her symptoms and they worsen I recommend she come back to the emergency department for further evaluation when she is symptomatic. The patient expresses understanding and agreement with this plan and is discharged home.      Procedures    FINAL IMPRESSION      1. Episodic lightheadedness          DISPOSITION/PLAN   DISPOSITION Decision To Discharge

## 2022-08-05 NOTE — DISCHARGE INSTRUCTIONS
Please stay very well-hydrated and also eat frequent small meals. If you feel lightheaded or dizzy please drink some juice or eat a small sugary snack and see if this improves her symptoms. If it does not improve your symptoms please come back for further evaluation.